# Patient Record
Sex: MALE | Race: WHITE
[De-identification: names, ages, dates, MRNs, and addresses within clinical notes are randomized per-mention and may not be internally consistent; named-entity substitution may affect disease eponyms.]

---

## 2019-04-25 ENCOUNTER — HOSPITAL ENCOUNTER (INPATIENT)
Dept: HOSPITAL 10 - E/R | Age: 69
LOS: 5 days | Discharge: HOME | DRG: 948 | End: 2019-04-30
Attending: INTERNAL MEDICINE | Admitting: INTERNAL MEDICINE
Payer: MEDICARE

## 2019-04-25 ENCOUNTER — HOSPITAL ENCOUNTER (INPATIENT)
Dept: HOSPITAL 91 - E/R | Age: 69
LOS: 5 days | Discharge: HOME | DRG: 948 | End: 2019-04-30
Payer: MEDICARE

## 2019-04-25 VITALS
HEIGHT: 65 IN | HEIGHT: 65 IN | WEIGHT: 153.22 LBS | BODY MASS INDEX: 25.53 KG/M2 | BODY MASS INDEX: 25.53 KG/M2 | WEIGHT: 153.22 LBS

## 2019-04-25 VITALS — HEART RATE: 98 BPM | DIASTOLIC BLOOD PRESSURE: 68 MMHG | RESPIRATION RATE: 17 BRPM | SYSTOLIC BLOOD PRESSURE: 108 MMHG

## 2019-04-25 VITALS — HEART RATE: 81 BPM | SYSTOLIC BLOOD PRESSURE: 112 MMHG | RESPIRATION RATE: 18 BRPM | DIASTOLIC BLOOD PRESSURE: 68 MMHG

## 2019-04-25 DIAGNOSIS — T40.605A: ICD-10-CM

## 2019-04-25 DIAGNOSIS — C61: ICD-10-CM

## 2019-04-25 DIAGNOSIS — G89.3: Primary | ICD-10-CM

## 2019-04-25 DIAGNOSIS — R11.2: ICD-10-CM

## 2019-04-25 DIAGNOSIS — E46: ICD-10-CM

## 2019-04-25 DIAGNOSIS — C79.51: ICD-10-CM

## 2019-04-25 LAB
% IRON SATURATION: 7 % SAT (ref 22–52)
ABNORMAL IP MESSAGE: 1
ADD MAN DIFF?: NO
ALANINE AMINOTRANSFERASE: 12 IU/L (ref 13–69)
ALBUMIN/GLOBULIN RATIO: 1.14
ALBUMIN: 4 G/DL (ref 3.3–4.9)
ALKALINE PHOSPHATASE: 226 IU/L (ref 42–121)
ANION GAP: 11 (ref 5–13)
ASPARTATE AMINO TRANSFERASE: 40 IU/L (ref 15–46)
BASOPHIL #: 0 10^3/UL (ref 0–0.1)
BASOPHILS %: 0.3 % (ref 0–2)
BILIRUBIN,DIRECT: 0 MG/DL (ref 0–0.2)
BILIRUBIN,TOTAL: 0.2 MG/DL (ref 0.2–1.3)
BLOOD UREA NITROGEN: 11 MG/DL (ref 7–20)
CALCIUM: 10 MG/DL (ref 8.4–10.2)
CARBON DIOXIDE: 28 MMOL/L (ref 21–31)
CHLORIDE: 98 MMOL/L (ref 97–110)
CREATININE: 0.76 MG/DL (ref 0.61–1.24)
EOSINOPHILS #: 0.1 10^3/UL (ref 0–0.5)
EOSINOPHILS %: 0.6 % (ref 0–7)
FERRITIN: 139 NG/ML (ref 11.1–264)
GLOBULIN: 3.5 G/DL (ref 1.3–3.2)
GLUCOSE: 167 MG/DL (ref 70–220)
HEMATOCRIT: 31.7 % (ref 42–52)
HEMOGLOBIN: 10.2 G/DL (ref 14–18)
IMMATURE GRANS #M: 0.04 10^3/UL (ref 0–0.03)
IMMATURE GRANS % (M): 0.4 % (ref 0–0.43)
INR: 1.11
IRON: 21 UG/DL (ref 35–150)
LACTATE DEHYDROGENASE: 556 IU/L (ref 313–618)
LYMPHOCYTES #: 0.5 10^3/UL (ref 0.8–2.9)
LYMPHOCYTES %: 4.4 % (ref 15–51)
MEAN CORPUSCULAR HEMOGLOBIN: 27.4 PG (ref 29–33)
MEAN CORPUSCULAR HGB CONC: 32.2 G/DL (ref 32–37)
MEAN CORPUSCULAR VOLUME: 85.2 FL (ref 82–101)
MEAN PLATELET VOLUME: 9 FL (ref 7.4–10.4)
MONOCYTE #: 0.7 10^3/UL (ref 0.3–0.9)
MONOCYTES %: 6.7 % (ref 0–11)
NEUTROPHIL #: 9 10^3/UL (ref 1.6–7.5)
NEUTROPHILS %: 87.6 % (ref 39–77)
NUCLEATED RED BLOOD CELLS #: 0 10^3/UL (ref 0–0)
NUCLEATED RED BLOOD CELLS%: 0 /100WBC (ref 0–0)
PARTIAL THROMBOPLASTIN TIME: 38.4 SEC (ref 23–35)
PLATELET COUNT: 384 10^3/UL (ref 140–415)
POTASSIUM: 4.5 MMOL/L (ref 3.5–5.1)
PROTIME: 14.4 SEC (ref 11.9–14.9)
PT RATIO: 1.1
RED BLOOD COUNT: 3.72 10^6/UL (ref 4.7–6.1)
RED CELL DISTRIBUTION WIDTH: 16 % (ref 11.5–14.5)
RETICULOCYTE COUNT #: 0.02 X10^6 (ref 0.02–0.11)
RETICULOCYTE COUNT %: 0.6 % (ref 0.5–1.5)
RETICULOCYTE RBC: 3.72
SODIUM: 137 MMOL/L (ref 135–144)
TOTAL IRON BINDING CAPACITY: 283 UG/DL (ref 241–421)
TOTAL PROTEIN: 7.5 G/DL (ref 6.1–8.1)
WHITE BLOOD COUNT: 10.3 10^3/UL (ref 4.8–10.8)

## 2019-04-25 PROCEDURE — 84100 ASSAY OF PHOSPHORUS: CPT

## 2019-04-25 PROCEDURE — 85025 COMPLETE CBC W/AUTO DIFF WBC: CPT

## 2019-04-25 PROCEDURE — 97110 THERAPEUTIC EXERCISES: CPT

## 2019-04-25 PROCEDURE — 86850 RBC ANTIBODY SCREEN: CPT

## 2019-04-25 PROCEDURE — 85730 THROMBOPLASTIN TIME PARTIAL: CPT

## 2019-04-25 PROCEDURE — 80053 COMPREHEN METABOLIC PANEL: CPT

## 2019-04-25 PROCEDURE — 97116 GAIT TRAINING THERAPY: CPT

## 2019-04-25 PROCEDURE — 80048 BASIC METABOLIC PNL TOTAL CA: CPT

## 2019-04-25 PROCEDURE — 83540 ASSAY OF IRON: CPT

## 2019-04-25 PROCEDURE — 93005 ELECTROCARDIOGRAM TRACING: CPT

## 2019-04-25 PROCEDURE — 86901 BLOOD TYPING SEROLOGIC RH(D): CPT

## 2019-04-25 PROCEDURE — 97163 PT EVAL HIGH COMPLEX 45 MIN: CPT

## 2019-04-25 PROCEDURE — 83615 LACTATE (LD) (LDH) ENZYME: CPT

## 2019-04-25 PROCEDURE — 86900 BLOOD TYPING SEROLOGIC ABO: CPT

## 2019-04-25 PROCEDURE — 82728 ASSAY OF FERRITIN: CPT

## 2019-04-25 PROCEDURE — 84466 ASSAY OF TRANSFERRIN: CPT

## 2019-04-25 PROCEDURE — 71045 X-RAY EXAM CHEST 1 VIEW: CPT

## 2019-04-25 PROCEDURE — 85610 PROTHROMBIN TIME: CPT

## 2019-04-25 PROCEDURE — 97530 THERAPEUTIC ACTIVITIES: CPT

## 2019-04-25 PROCEDURE — 83735 ASSAY OF MAGNESIUM: CPT

## 2019-04-25 PROCEDURE — 85045 AUTOMATED RETICULOCYTE COUNT: CPT

## 2019-04-25 RX ADMIN — TAMSULOSIN HYDROCHLORIDE 1 MG: 0.4 CAPSULE ORAL at 20:40

## 2019-04-25 RX ADMIN — ONDANSETRON HYDROCHLORIDE 1 MG: 2 INJECTION, SOLUTION INTRAMUSCULAR; INTRAVENOUS at 10:37

## 2019-04-25 RX ADMIN — ONDANSETRON HYDROCHLORIDE 1 MG: 2 INJECTION, SOLUTION INTRAMUSCULAR; INTRAVENOUS at 13:00

## 2019-04-25 RX ADMIN — HYDROMORPHONE HYDROCHLORIDE PRN MG: 1 INJECTION, SOLUTION INTRAMUSCULAR; INTRAVENOUS; SUBCUTANEOUS at 18:30

## 2019-04-25 RX ADMIN — MORPHINE SULFATE SCH MG: 30 TABLET, EXTENDED RELEASE ORAL at 20:40

## 2019-04-25 RX ADMIN — MORPHINE SULFATE 1 MG: 30 TABLET, EXTENDED RELEASE ORAL at 20:40

## 2019-04-25 RX ADMIN — TAMSULOSIN HYDROCHLORIDE SCH MG: 0.4 CAPSULE ORAL at 20:40

## 2019-04-25 RX ADMIN — HYDROMORPHONE HYDROCHLORIDE 1 MG: 1 INJECTION, SOLUTION INTRAMUSCULAR; INTRAVENOUS; SUBCUTANEOUS at 18:30

## 2019-04-25 RX ADMIN — THIAMINE HYDROCHLORIDE 1 MLS/HR: 100 INJECTION, SOLUTION INTRAMUSCULAR; INTRAVENOUS at 10:36

## 2019-04-25 RX ADMIN — HYDROMORPHONE HYDROCHLORIDE 1 MG: 2 INJECTION, SOLUTION INTRAMUSCULAR; INTRAVENOUS; SUBCUTANEOUS at 17:15

## 2019-04-25 RX ADMIN — ACETAMINOPHEN 1 MG: 325 TABLET, FILM COATED ORAL at 18:24

## 2019-04-25 RX ADMIN — HYDROMORPHONE HYDROCHLORIDE 1 MG: 1 INJECTION, SOLUTION INTRAMUSCULAR; INTRAVENOUS; SUBCUTANEOUS at 13:00

## 2019-04-25 RX ADMIN — HYDROMORPHONE HYDROCHLORIDE 1 MG: 1 INJECTION, SOLUTION INTRAMUSCULAR; INTRAVENOUS; SUBCUTANEOUS at 10:37

## 2019-04-25 NOTE — HP
Date/Time of Note


Date/Time of Note


DATE: 4/25/19 


TIME: 17:07





Assessment/Plan


VTE Prophylaxis


Pharmacological prophylaxis:  LMWH





Lines/Catheters


IV Catheter Type (from Nrsg):  Saline Lock





Assessment/Plan


Hospital Course


1.  Intractable pain secondary to metastatic prostate cancer


Continue home MS Contin 60 mg twice daily and will add Percocet and Dilaudid as 


needed


Follow-up with oncologist as outpatient





Prophylaxis: Lovenox


Result Diagram:  


4/25/19 1013                                                                    


           4/25/19 1013





Results 24hrs





Laboratory Tests


       Test
                                4/25/19
10:13  4/25/19
10:58


       White Blood Count                           10.3


       Red Blood Count                           3.72  #L


       Hemoglobin                                10.2  #L


       Hematocrit                                31.7  #L


       Mean Corpuscular Volume                     85.2


       Mean Corpuscular Hemoglobin                27.4  L


       Mean Corpuscular Hemoglobin
Concent        32.2  
  



       Red Cell Distribution Width                16.0  H


       Platelet Count                               384


       Mean Platelet Volume                         9.0


       Immature Granulocytes %                    0.400


       Neutrophils %                              87.6  H


       Lymphocytes %                               4.4  L


       Monocytes %                                  6.7


       Eosinophils %                                0.6


       Basophils %                                  0.3


       Nucleated Red Blood Cells %                  0.0


       Immature Granulocytes #                   0.040  H


       Neutrophils #                               9.0  H


       Lymphocytes #                               0.5  L


       Monocytes #                                  0.7


       Eosinophils #                                0.1


       Basophils #                                  0.0


       Nucleated Red Blood Cells #                  0.0


       Absolute Reticulocyte Count                0.023


       Percent Reticulocyte Count                   0.6


       Sodium Level                                 137


       Potassium Level                              4.5


       Chloride Level                                98


       Carbon Dioxide Level                          28


       Anion Gap                                     11


       Blood Urea Nitrogen                           11


       Creatinine                                  0.76


       Est Glomerular Filtrat Rate
mL/min   > 60  
        



       Glucose Level                                167


       Calcium Level                               10.0


       Iron Level                                   21  L


       Total Iron Binding Capacity                  283


       Percent Iron Saturation                       7  L


       Ferritin                                   139.0


       Total Bilirubin                              0.2


       Direct Bilirubin                            0.00


       Indirect Bilirubin                           0.2


       Aspartate Amino Transf
(AST/SGOT)            40  
  



       Alanine Aminotransferase
(ALT/SGPT)         12  L
  



       Alkaline Phosphatase                        226  H


       Lactate Dehydrogenase                        556


       Total Protein                                7.5


       Albumin                                      4.0


       Globulin                                   3.50  H


       Albumin/Globulin Ratio                      1.14


       Prothrombin Time                                           14.4


       Prothrombin Time Ratio                                      1.1


       INR International Normalized
Ratio   
                    1.11  



       Activated Partial
Thromboplast Time  
                   38.4  H









HPI/ROS


Admit Date/Time


Admit Date/Time


April 25, 2019





Hx of Present Illness


Patient is a 68-year-old male with a history of metastatic prostate cancer 


status post multiple rounds of therapy with no significant improvement.  Patient


has been told by his oncologist that they will monitor from now and potentially 


restart treatment in the future.  Patient lives with his mom and has been 


nonambulatory for the past several weeks and presents with worsening diffuse 


body pain.  Patient has been taking MS Contin 60 mg every 12 hours with no 


significant improvement in pain.  Patient has no other complaints at this time.





ROS


Constitutional:  no complaints, improved


Eyes:  no complaints


ENT:  no complaints


Respiratory:  no complaints


Cardiovascular:  no complaints


Gastrointestinal:  no complaints


Genitourinary:  no complaints


Musculoskeletal:  bone/joint pain


Skin:  no complaints


Neurologic:  no complaints


Endocrine:  no complaints


Lymphatic:  no complaints


Psychological:  no complaints, nl mood/affect


Immunologic:  no complaints





PMH/Family/Social


Past Medical History


Metastatic prostate cancer


Medications





Current Medications


Ondansetron HCl (Zofran Inj) 4 mg BRIDGE ORDER PRN IV NAUSEA/VOMITING;  Start 


4/25/19 at 14:30;  Stop 4/26/19 at 14:29


Acetaminophen (Tylenol Tab) 650 mg ER BRIDGE PRN PO .MILD PAIN 1-3 OR TEMP;  


Start 4/25/19 at 14:30;  Stop 4/26/19 at 14:29


Coded Allergies:  


     tetracycline (Unverified  Allergy, Unknown, RASH, 4/25/19)





Past Surgical History


Past Surgical Hx:  no surgical history





Family History


Significant Family History:  no pertinent family hx





Social History


Alcohol Use:  rarely


Smoking Status:  Never smoker


Drug Use:  none





Exam/Review of Systems


Vital Signs


Vitals





Vital Signs


  Date      Temp  Pulse  Resp  B/P (MAP)   Pulse Ox  O2          O2 Flow    FiO2


Time                                                 Delivery    Rate


   4/25/19  98.2    109    20      148/97        94  Room Air


     15:28                          (114)








Exam


Constitutional:  alert, oriented


Respiratory:  clear to auscultation


Cardiovascular:  regular rate and rhythm


Gastrointestinal:  soft; 


   No distended


Musculoskeletal:  nl extremities to inspection











ZOEY LÓPEZ                  Apr 25, 2019 17:09

## 2019-04-26 VITALS — HEART RATE: 97 BPM | DIASTOLIC BLOOD PRESSURE: 68 MMHG | RESPIRATION RATE: 18 BRPM | SYSTOLIC BLOOD PRESSURE: 109 MMHG

## 2019-04-26 VITALS — SYSTOLIC BLOOD PRESSURE: 90 MMHG | HEART RATE: 92 BPM | DIASTOLIC BLOOD PRESSURE: 57 MMHG | RESPIRATION RATE: 18 BRPM

## 2019-04-26 VITALS — DIASTOLIC BLOOD PRESSURE: 58 MMHG | RESPIRATION RATE: 18 BRPM | HEART RATE: 95 BPM | SYSTOLIC BLOOD PRESSURE: 101 MMHG

## 2019-04-26 VITALS — DIASTOLIC BLOOD PRESSURE: 68 MMHG | SYSTOLIC BLOOD PRESSURE: 103 MMHG | RESPIRATION RATE: 20 BRPM | HEART RATE: 101 BPM

## 2019-04-26 LAB
ADD MAN DIFF?: NO
ANION GAP: 10 (ref 5–13)
BASOPHIL #: 0 10^3/UL (ref 0–0.1)
BASOPHILS %: 0.5 % (ref 0–2)
BLOOD UREA NITROGEN: 9 MG/DL (ref 7–20)
CALCIUM: 9.3 MG/DL (ref 8.4–10.2)
CARBON DIOXIDE: 30 MMOL/L (ref 21–31)
CHLORIDE: 93 MMOL/L (ref 97–110)
CREATININE: 0.66 MG/DL (ref 0.61–1.24)
EOSINOPHILS #: 0.2 10^3/UL (ref 0–0.5)
EOSINOPHILS %: 2.6 % (ref 0–7)
GLUCOSE: 107 MG/DL (ref 70–220)
HEMATOCRIT: 28 % (ref 42–52)
HEMOGLOBIN: 9 G/DL (ref 14–18)
IMMATURE GRANS #M: 0.03 10^3/UL (ref 0–0.03)
IMMATURE GRANS % (M): 0.3 % (ref 0–0.43)
LYMPHOCYTES #: 0.9 10^3/UL (ref 0.8–2.9)
LYMPHOCYTES %: 10.6 % (ref 15–51)
MAGNESIUM: 1.9 MG/DL (ref 1.7–2.5)
MEAN CORPUSCULAR HEMOGLOBIN: 27.1 PG (ref 29–33)
MEAN CORPUSCULAR HGB CONC: 32.1 G/DL (ref 32–37)
MEAN CORPUSCULAR VOLUME: 84.3 FL (ref 82–101)
MEAN PLATELET VOLUME: 9.3 FL (ref 7.4–10.4)
MONOCYTE #: 1.1 10^3/UL (ref 0.3–0.9)
MONOCYTES %: 12.1 % (ref 0–11)
NEUTROPHIL #: 6.6 10^3/UL (ref 1.6–7.5)
NEUTROPHILS %: 73.9 % (ref 39–77)
NUCLEATED RED BLOOD CELLS #: 0 10^3/UL (ref 0–0)
NUCLEATED RED BLOOD CELLS%: 0 /100WBC (ref 0–0)
PHOSPHORUS: 4 MG/DL (ref 2.5–4.9)
PLATELET COUNT: 368 10^3/UL (ref 140–415)
POTASSIUM: 4.3 MMOL/L (ref 3.5–5.1)
RED BLOOD COUNT: 3.32 10^6/UL (ref 4.7–6.1)
RED CELL DISTRIBUTION WIDTH: 16.1 % (ref 11.5–14.5)
SODIUM: 133 MMOL/L (ref 135–144)
TRANSFERRIN: 190 MG/DL (ref 188–341)
WHITE BLOOD COUNT: 8.9 10^3/UL (ref 4.8–10.8)

## 2019-04-26 RX ADMIN — Medication SCH MLS/HR: at 19:03

## 2019-04-26 RX ADMIN — ONDANSETRON HYDROCHLORIDE 1 MG: 2 INJECTION, SOLUTION INTRAMUSCULAR; INTRAVENOUS at 21:00

## 2019-04-26 RX ADMIN — Medication 1 MLS/HR: at 19:03

## 2019-04-26 RX ADMIN — ENOXAPARIN SODIUM SCH MG: 100 INJECTION SUBCUTANEOUS at 09:20

## 2019-04-26 RX ADMIN — MORPHINE SULFATE 1 MG: 15 TABLET, EXTENDED RELEASE ORAL at 21:00

## 2019-04-26 RX ADMIN — OXYCODONE HYDROCHLORIDE AND ACETAMINOPHEN 1 TAB: 10; 325 TABLET ORAL at 05:30

## 2019-04-26 RX ADMIN — HYDROMORPHONE HYDROCHLORIDE 1 MG: 1 INJECTION, SOLUTION INTRAMUSCULAR; INTRAVENOUS; SUBCUTANEOUS at 21:17

## 2019-04-26 RX ADMIN — DEXAMETHASONE SODIUM PHOSPHATE 1 MG: 10 INJECTION, SOLUTION INTRAMUSCULAR; INTRAVENOUS at 23:26

## 2019-04-26 RX ADMIN — ENOXAPARIN SODIUM 1 MG: 100 INJECTION SUBCUTANEOUS at 09:20

## 2019-04-26 RX ADMIN — MORPHINE SULFATE SCH MG: 30 TABLET, EXTENDED RELEASE ORAL at 21:00

## 2019-04-26 RX ADMIN — MORPHINE SULFATE 1 MG: 30 TABLET, EXTENDED RELEASE ORAL at 21:00

## 2019-04-26 RX ADMIN — MAGNESIUM HYDROXIDE 1 ML: 400 SUSPENSION ORAL at 13:27

## 2019-04-26 RX ADMIN — MORPHINE SULFATE SCH MG: 30 TABLET, EXTENDED RELEASE ORAL at 09:21

## 2019-04-26 RX ADMIN — DEXAMETHASONE SODIUM PHOSPHATE 1 MG: 10 INJECTION, SOLUTION INTRAMUSCULAR; INTRAVENOUS at 18:00

## 2019-04-26 RX ADMIN — MORPHINE SULFATE 1 MG: 30 TABLET, EXTENDED RELEASE ORAL at 09:21

## 2019-04-26 RX ADMIN — ONDANSETRON HYDROCHLORIDE 1 MG: 2 INJECTION, SOLUTION INTRAMUSCULAR; INTRAVENOUS at 00:44

## 2019-04-26 RX ADMIN — MORPHINE SULFATE 1 MG: 30 TABLET, EXTENDED RELEASE ORAL at 13:27

## 2019-04-26 RX ADMIN — DEXAMETHASONE SODIUM PHOSPHATE SCH MG: 10 INJECTION, SOLUTION INTRAMUSCULAR; INTRAVENOUS at 18:00

## 2019-04-26 RX ADMIN — TAMSULOSIN HYDROCHLORIDE SCH MG: 0.4 CAPSULE ORAL at 23:15

## 2019-04-26 RX ADMIN — MAGNESIUM HYDROXIDE 1 ML: 400 SUSPENSION ORAL at 02:04

## 2019-04-26 RX ADMIN — HYDROMORPHONE HYDROCHLORIDE PRN MG: 1 INJECTION, SOLUTION INTRAMUSCULAR; INTRAVENOUS; SUBCUTANEOUS at 00:42

## 2019-04-26 RX ADMIN — DEXAMETHASONE SODIUM PHOSPHATE PRN MG: 10 INJECTION, SOLUTION INTRAMUSCULAR; INTRAVENOUS at 00:44

## 2019-04-26 RX ADMIN — DEXAMETHASONE SODIUM PHOSPHATE PRN MG: 10 INJECTION, SOLUTION INTRAMUSCULAR; INTRAVENOUS at 21:00

## 2019-04-26 RX ADMIN — Medication 1 MLS/HR: at 23:34

## 2019-04-26 RX ADMIN — HYDROMORPHONE HYDROCHLORIDE 1 MG: 1 INJECTION, SOLUTION INTRAMUSCULAR; INTRAVENOUS; SUBCUTANEOUS at 00:42

## 2019-04-26 RX ADMIN — TAMSULOSIN HYDROCHLORIDE 1 MG: 0.4 CAPSULE ORAL at 23:15

## 2019-04-26 RX ADMIN — Medication SCH MLS/HR: at 23:34

## 2019-04-26 RX ADMIN — DEXAMETHASONE SODIUM PHOSPHATE SCH MG: 10 INJECTION, SOLUTION INTRAMUSCULAR; INTRAVENOUS at 23:26

## 2019-04-26 NOTE — PN
Date/Time of Note


Date/Time of Note


DATE: 4/26/19 


TIME: 16:14





Assessment/Plan


VTE Prophylaxis


Risk score (from Nsg)>0 risk:  3


SCD applied (from Nsg):  Yes


Pharmacological prophylaxis:  LMWH





Lines/Catheters


IV Catheter Type (from Nrsg):  Saline Lock





Assessment/Plan


Hospital Course


1.  Intractable pain secondary to metastatic prostate cancer


Patient still with pain although improved, increase MS Contin frequency to every


8 hours


Continue Percocet and Dilaudid as needed


Palliative care consultation obtained


 to arrange for walker and home health


Continue PT


Follow-up with oncologist as outpatient, patient would like to continue 


chemotherapy in the future





Prophylaxis: Lovenox


Result Diagram:  


4/26/19 0621                                                                    


           4/26/19 0621





Results 24hrs





Laboratory Tests


               Test
                                4/26/19
06:21


               White Blood Count                            8.9


               Red Blood Count                            3.32  L


               Hemoglobin                                  9.0  L


               Hematocrit                                 28.0  L


               Mean Corpuscular Volume                     84.3


               Mean Corpuscular Hemoglobin                27.1  L


               Mean Corpuscular Hemoglobin
Concent        32.1  



               Red Cell Distribution Width                16.1  H


               Platelet Count                               368


               Mean Platelet Volume                         9.3


               Immature Granulocytes %                    0.300


               Neutrophils %                               73.9


               Lymphocytes %                              10.6  L


               Monocytes %                                12.1  H


               Eosinophils %                                2.6


               Basophils %                                  0.5


               Nucleated Red Blood Cells %                  0.0


               Immature Granulocytes #                    0.030


               Neutrophils #                                6.6


               Lymphocytes #                                0.9


               Monocytes #                                 1.1  H


               Eosinophils #                                0.2


               Basophils #                                  0.0


               Nucleated Red Blood Cells #                  0.0


               Sodium Level                                133  L


               Potassium Level                              4.3


               Chloride Level                               93  L


               Carbon Dioxide Level                          30


               Anion Gap                                     10


               Blood Urea Nitrogen                            9


               Creatinine                                  0.66


               Est Glomerular Filtrat Rate
mL/min   > 60  



               Glucose Level                               107  #


               Calcium Level                                9.3


               Phosphorus Level                             4.0


               Magnesium Level                              1.9








Subjective


24 Hr Interval Summary


Musculoskeletal:  bone/joint pain





Exam/Review of Systems


Exam


Vitals





Vital Signs


  Date      Temp  Pulse  Resp  B/P (MAP)   Pulse Ox  O2          O2 Flow    FiO2


Time                                                 Delivery    Rate


   4/26/19  98.7     92    18  90/57 (68)        93  Room Air


     14:42








Intake and Output





4/25/19 4/25/19 4/26/19





1515:00


23:00


07:00





IntakeIntake Total


400 ml


420 ml





BalanceBalance


400 ml


420 ml











Constitutional:  alert, oriented


Respiratory:  clear to auscultation


Cardiovascular:  regular rate and rhythm


Gastrointestinal:  soft; 


   No distended


Musculoskeletal:  nl extremities to inspection





Results


Results 24hrs





Laboratory Tests


               Test
                                4/26/19
06:21


               White Blood Count                            8.9


               Red Blood Count                            3.32  L


               Hemoglobin                                  9.0  L


               Hematocrit                                 28.0  L


               Mean Corpuscular Volume                     84.3


               Mean Corpuscular Hemoglobin                27.1  L


               Mean Corpuscular Hemoglobin
Concent        32.1  



               Red Cell Distribution Width                16.1  H


               Platelet Count                               368


               Mean Platelet Volume                         9.3


               Immature Granulocytes %                    0.300


               Neutrophils %                               73.9


               Lymphocytes %                              10.6  L


               Monocytes %                                12.1  H


               Eosinophils %                                2.6


               Basophils %                                  0.5


               Nucleated Red Blood Cells %                  0.0


               Immature Granulocytes #                    0.030


               Neutrophils #                                6.6


               Lymphocytes #                                0.9


               Monocytes #                                 1.1  H


               Eosinophils #                                0.2


               Basophils #                                  0.0


               Nucleated Red Blood Cells #                  0.0


               Sodium Level                                133  L


               Potassium Level                              4.3


               Chloride Level                               93  L


               Carbon Dioxide Level                          30


               Anion Gap                                     10


               Blood Urea Nitrogen                            9


               Creatinine                                  0.66


               Est Glomerular Filtrat Rate
mL/min   > 60  



               Glucose Level                               107  #


               Calcium Level                                9.3


               Phosphorus Level                             4.0


               Magnesium Level                              1.9








Medications


Medication





Current Medications


IV Flush (NS 3 ml) 3 ml PER PROTOCOL IV ;  Start 4/25/19 at 17:30


Ondansetron HCl (Zofran Inj) 4 mg Q6H  PRN IV NAUSEA/VOMITING Last administered 


on 4/26/19at 00:44; Admin Dose 4 MG;  Start 4/25/19 at 17:30


Acetaminophen (Tylenol Tab) 650 mg Q6H  PRN PO .PAIN 1-3 OR TEMP Last 


administered on 4/25/19at 18:24; Admin Dose 650 MG;  Start 4/25/19 at 17:30


Zolpidem Tartrate (Ambien) 5 mg QHS  PRN PO .INSOMNIA;  Start 4/25/19 at 17:30


Enoxaparin Sodium (Lovenox) 40 mg DAILY SC  Last administered on 4/26/19at 


09:20; Admin Dose 40 MG;  Start 4/26/19 at 09:00


Oxycodone/ Acetaminophen (Endocet (10/ 325)) 1 tab Q4H  PRN PO MODERATE PAIN 


LEVEL 4-6 Last administered on 4/26/19at 05:30; Admin Dose 1 TAB;  Start 4/25/19


at 17:30


Hydromorphone HCl (Dilaudid) 1 mg Q4H  PRN IV SEVERE PAIN LEVEL 7-10 Last 


administered on 4/26/19at 00:42; Admin Dose 1 MG;  Start 4/25/19 at 17:30


Tamsulosin HCl (Flomax) 0.4 mg HS PO  Last administered on 4/25/19at 20:40; 


Admin Dose 0.4 MG;  Start 4/25/19 at 21:00


Magnesium Hydroxide (Milk Of Mag) 30 ml Q12  PRN PO CONSTIPATION Last 


administered on 4/26/19at 13:27; Admin Dose 30 ML;  Start 4/26/19 at 01:00


Morphine Sulfate (Ms Contin (Er)) 60 mg TID PO  Last administered on 4/26/19at 


13:27; Admin Dose 60 MG;  Start 4/26/19 at 13:30











ZOEY LÓPEZ                  Apr 26, 2019 16:16

## 2019-04-26 NOTE — CONS
Assessment/Plan


Assessment/Plan


Assessment/Plan (Daily)


Metastatic prostate cancer


Metastasis to bones


Malnutrition


Pain out-of-control


Nausea vomiting possibly related to opioids


Depression anxiety





Because patient's pain is currently out of control it is some question whether 


or not oral opioids may be causing his emesis we will switch him over to PCA 


Dilaudid this evening.  We will give him 1 dose of 4 mg of IV Zometa start him 


off on nonsteroidal anti-inflammatory medications and Decadron for now 


anticipate tapering to p.o. medications beginning 1 to 2 days.  Ideally he would


be well controlled with methadone as an outpatient for bony metastasis however 


will discuss with Dr. Stovall in more detail.





Consultation Date/Type/Reason


Admit Date/Time


April 25, 2019


Date/Time of Note


DATE: 4/26/19 


TIME: 17:56





Hx of Present Illness


This is a 68-year-old gentleman is a very poor historian who presents [facility] with a history of metastatic prostate cancer with bony 


mets with pain out of control.  Patient describes his pain as excruciating bilat


eral shoulders lumbosacral spine bilateral pelvic regions and bilateral lower 


extremities.  Patient is been accelerating in intensity and then he is unclear 


as to how much morphine he was taking at home how much Norco.  However he states


his pain became out of control and presented to [facility] 


describes his pain is 10/10 before he came to hospital he is still very 


uncomfortable at this time unable to ambulate without assistance.  Pain is 


interfering with his physical functioning his mood and is sleeping pattern.  


Patient states he has been nauseous prior to hospitalization and vomiting during


his hospitalization but is not sure whether not is from the morphine or from Per


cocet.  Patient denies constipation itching mental cloudiness sweating fatigue 


drowsiness he is not negotiating for higher dose of pain control medication.  


There is no history of purposeful oversedation he does not appear to be 


intoxicated unkempt he is uncomfortable on examination with minimal movement.  


He is not requesting for higher doses just reasonable control of his current 


pain accelerates with minimal movement.  He has not asked for change in the 


route of administration there is no history of contact with street drugs.


We have an incomplete database and so far as what chemotherapy he has been given


in the past for last time he was administered chemotherapy.


Constitutional:  no complaints, improved


Eyes:  No no complaints, No pain, No discharge, No redness, No visual change, No


other


ENT:  no complaints; 


   No bleeding, No pain, No congestion, No discharge, No dysphagia, No sore 


throat, No other


Respiratory:  no complaints; 


   No pain, No cough, No pleuritic pain, No shortness of breath, No sputum, No 


wheezing, No other


Cardiovascular:  no complaints; 


   No chest pain, No edema, No lightheadedness, No orthopenea, No palpitations, 


No paroxysmal nocturnal dyspnea, No other


Genitourinary:  other (Refer to history of present illness)


Musculoskeletal:  other


Neurologic:  no complaints; 


   No confusion, No dizziness, No focal-weakness, No headache, No syncope, No 


seizure, No other


Psychological:  anxiety, depression





Past Medical History


Medical History:  cancer, other (History of prostate cancer with mets to the 


bone)


Home Meds


Active Scripts


Ondansetron Hcl* (Zofran* ODT) 4 mg -ODT Tab.disper, 4 MG PO Q4H PRN for NAUSEA 


AND OR VOMITING, #20 TAB


   Prov:AIDAN WESTBROOK MD         11/8/15


Reported Medications


Morphine Sulfate (Morphine Sulfate ER) 15 Mg Tablet.er, 1 TAB ORAL BID


   4/25/19


Multivitamins* (Once Daily*) 1 Tab Tablet, 1 TAB PO DAILY, TAB


   11/8/15


Tamsulosin Hcl* (Tamsulosin Hcl*) 0.4 Mg Cap.er.24h, 0.4 MG PO HS, CAP


   11/8/15


Discontinued Reported Medications


Citalopram Hydrobromide* (Citalopram Hydrobromide*) 20 Mg Tablet, 20 MG PO QHS, 


TAB


   11/8/15


Naproxen* (Naprosyn*) 500 Mg Tablet, 500 MG PO BID PRN for PAIN, TAB


   11/8/15


Discontinued Scripts


Cyclobenzaprine Hcl* (Cyclobenzaprine Hcl*) 10 Mg Tablet, 10 MG PO TID, #15 TAB


   Prov:JOSHUA GONSLAEZ DO         3/28/16


Ibuprofen* (Motrin*) 600 Mg Tab, 600 MG PO Q8, #14 TAB


   Prov:JOSHUA GONSALEZ DO         3/28/16


Hydrocodone Bit-Acetaminophen* (Norco*) 5-325 Mg Tab, 1 TAB PO Q6 PRN for PAIN, 


#7 TAB


   Prov:JOSHUA GONSALEZ DO         3/28/16


Ciprofloxacin Hcl* (Ciprofloxacin Hcl*) 500 Mg Tablet, 500 MG PO BID for 10 


Days, TAB


   Prov:AIDAN WESTBROOK MD         11/8/15


Medications





Current Medications


IV Flush (NS 3 ml) 3 ml PER PROTOCOL IV ;  Start 4/25/19 at 17:30


Ondansetron HCl (Zofran Inj) 4 mg Q6H  PRN IV NAUSEA/VOMITING Last administered 


on 4/26/19at 00:44; Admin Dose 4 MG;  Start 4/25/19 at 17:30


Acetaminophen (Tylenol Tab) 650 mg Q6H  PRN PO .PAIN 1-3 OR TEMP Last 


administered on 4/25/19at 18:24; Admin Dose 650 MG;  Start 4/25/19 at 17:30


Zolpidem Tartrate (Ambien) 5 mg QHS  PRN PO .INSOMNIA;  Start 4/25/19 at 17:30


Enoxaparin Sodium (Lovenox) 40 mg DAILY SC  Last administered on 4/26/19at 


09:20; Admin Dose 40 MG;  Start 4/26/19 at 09:00


Oxycodone/ Acetaminophen (Endocet (10/ 325)) 1 tab Q4H  PRN PO MODERATE PAIN 


LEVEL 4-6 Last administered on 4/26/19at 05:30; Admin Dose 1 TAB;  Start 4/25/19


at 17:30


Hydromorphone HCl (Dilaudid) 1 mg Q4H  PRN IV SEVERE PAIN LEVEL 7-10 Last 


administered on 4/26/19at 00:42; Admin Dose 1 MG;  Start 4/25/19 at 17:30


Tamsulosin HCl (Flomax) 0.4 mg HS PO  Last administered on 4/25/19at 20:40; 


Admin Dose 0.4 MG;  Start 4/25/19 at 21:00


Magnesium Hydroxide (Milk Of Mag) 30 ml Q12  PRN PO CONSTIPATION Last ad


ministered on 4/26/19at 13:27; Admin Dose 30 ML;  Start 4/26/19 at 01:00


Morphine Sulfate (Ms Contin (Er)) 60 mg TID PO  Last administered on 4/26/19at 


13:27; Admin Dose 60 MG;  Start 4/26/19 at 13:30


Allergies:  


Coded Allergies:  


     tetracycline (Unverified  Allergy, Unknown, RASH, 4/25/19)





Past Surgical History


Past Surgical Hx:  no surgical history





Social History


Alcohol Use:  rarely


Smoking Status:  Never smoker


Drug Use:  none





Exam/Review of Systems


Exam


Vitals





Vital Signs


  Date      Temp  Pulse  Resp  B/P (MAP)   Pulse Ox  O2          O2 Flow    FiO2


Time                                                 Delivery    Rate


   4/26/19  98.7     92    18  90/57 (68)        93  Room Air


     14:42








Intake and Output





4/25/19 4/25/19 4/26/19





1515:00


23:00


07:00





IntakeIntake Total


400 ml


420 ml





BalanceBalance


400 ml


420 ml











Constitutional:  alert, oriented, well developed, distress, frail


Psych:  anxiety


Head:  normocephalic, atraumatic; 


   No lacerations, No hematomas, No other


Eyes:  nl conjunctiva, EOMI, nl lids, nl sclera, PERRL; 


   No icteric, No fundi, disc, No other


ENMT:  nl external ears & nose, nl lips & teeth, nl nasal mucosa & septum


Neck:  supple, non-tender; 


   No jvd, No bruits, No masses, No thyromegaly, No nuchal rigidity, No other


Respiratory:  clear to auscultation, normal air movement; 


   No congested cough, No crackles/rales, No diminished breath sounds, No 


intercostal retraction, No labored breathing, No respirations, No tactile 


fremitus, No wheezing, No other


Cardiovascular:  regular rate and rhythm, nl pulses; 


   No bruits, No diastolic murmur, No edema, No gallop, No irregular rhythm, No 


jugular venous distention (JVD), No murmurs/extra sounds, No rub, No systolic 


murmur, No S3, No S4, No other


Neurological:  CNS II-XII intact, nl mental status, nl speech, nl strength; 


   No confused, No DTR's symmetric, No focal weakness, No lethargic, No 


numbness, No reflexes, No unresponsive, No other





Results


Result Diagram:  


4/26/19 0621 4/26/19 0621





Results 24hrs





Laboratory Tests


               Test
                                4/26/19
06:21


               White Blood Count                            8.9


               Red Blood Count                            3.32  L


               Hemoglobin                                  9.0  L


               Hematocrit                                 28.0  L


               Mean Corpuscular Volume                     84.3


               Mean Corpuscular Hemoglobin                27.1  L


               Mean Corpuscular Hemoglobin
Concent        32.1  



               Red Cell Distribution Width                16.1  H


               Platelet Count                               368


               Mean Platelet Volume                         9.3


               Immature Granulocytes %                    0.300


               Neutrophils %                               73.9


               Lymphocytes %                              10.6  L


               Monocytes %                                12.1  H


               Eosinophils %                                2.6


               Basophils %                                  0.5


               Nucleated Red Blood Cells %                  0.0


               Immature Granulocytes #                    0.030


               Neutrophils #                                6.6


               Lymphocytes #                                0.9


               Monocytes #                                 1.1  H


               Eosinophils #                                0.2


               Basophils #                                  0.0


               Nucleated Red Blood Cells #                  0.0


               Sodium Level                                133  L


               Potassium Level                              4.3


               Chloride Level                               93  L


               Carbon Dioxide Level                          30


               Anion Gap                                     10


               Blood Urea Nitrogen                            9


               Creatinine                                  0.66


               Est Glomerular Filtrat Rate
mL/min   > 60  



               Glucose Level                               107  #


               Calcium Level                                9.3


               Phosphorus Level                             4.0


               Magnesium Level                              1.9








Medications


Medication





Current Medications


IV Flush (NS 3 ml) 3 ml PER PROTOCOL IV ;  Start 4/25/19 at 17:30


Ondansetron HCl (Zofran Inj) 4 mg Q6H  PRN IV NAUSEA/VOMITING Last administered 


on 4/26/19at 00:44; Admin Dose 4 MG;  Start 4/25/19 at 17:30


Acetaminophen (Tylenol Tab) 650 mg Q6H  PRN PO .PAIN 1-3 OR TEMP Last 


administered on 4/25/19at 18:24; Admin Dose 650 MG;  Start 4/25/19 at 17:30


Zolpidem Tartrate (Ambien) 5 mg QHS  PRN PO .INSOMNIA;  Start 4/25/19 at 17:30


Enoxaparin Sodium (Lovenox) 40 mg DAILY SC  Last administered on 4/26/19at 


09:20; Admin Dose 40 MG;  Start 4/26/19 at 09:00


Oxycodone/ Acetaminophen (Endocet (10/ 325)) 1 tab Q4H  PRN PO MODERATE PAIN 


LEVEL 4-6 Last administered on 4/26/19at 05:30; Admin Dose 1 TAB;  Start 4/25/19


at 17:30


Hydromorphone HCl (Dilaudid) 1 mg Q4H  PRN IV SEVERE PAIN LEVEL 7-10 Last 


administered on 4/26/19at 00:42; Admin Dose 1 MG;  Start 4/25/19 at 17:30


Tamsulosin HCl (Flomax) 0.4 mg HS PO  Last administered on 4/25/19at 20:40; 


Admin Dose 0.4 MG;  Start 4/25/19 at 21:00


Magnesium Hydroxide (Milk Of Mag) 30 ml Q12  PRN PO CONSTIPATION Last 


administered on 4/26/19at 13:27; Admin Dose 30 ML;  Start 4/26/19 at 01:00


Morphine Sulfate (Ms Contin (Er)) 60 mg TID PO  Last administered on 4/26/19at 


13:27; Admin Dose 60 MG;  Start 4/26/19 at 13:30











KECIA MATTHEW             Apr 26, 2019 18:00

## 2019-04-27 VITALS — SYSTOLIC BLOOD PRESSURE: 103 MMHG | DIASTOLIC BLOOD PRESSURE: 72 MMHG | HEART RATE: 77 BPM | RESPIRATION RATE: 16 BRPM

## 2019-04-27 VITALS — HEART RATE: 74 BPM | SYSTOLIC BLOOD PRESSURE: 102 MMHG | RESPIRATION RATE: 16 BRPM | DIASTOLIC BLOOD PRESSURE: 64 MMHG

## 2019-04-27 VITALS — RESPIRATION RATE: 18 BRPM | SYSTOLIC BLOOD PRESSURE: 101 MMHG | HEART RATE: 70 BPM | DIASTOLIC BLOOD PRESSURE: 66 MMHG

## 2019-04-27 RX ADMIN — ONDANSETRON HYDROCHLORIDE 1 MG: 2 INJECTION, SOLUTION INTRAMUSCULAR; INTRAVENOUS at 09:06

## 2019-04-27 RX ADMIN — ZOLEDRONIC ACID 1 MLS/HR: 4 INJECTION, SOLUTION, CONCENTRATE INTRAVENOUS at 01:14

## 2019-04-27 RX ADMIN — Medication SCH MLS/HR: at 05:35

## 2019-04-27 RX ADMIN — MORPHINE SULFATE SCH MG: 30 TABLET, EXTENDED RELEASE ORAL at 13:57

## 2019-04-27 RX ADMIN — DEXAMETHASONE SODIUM PHOSPHATE SCH MG: 10 INJECTION, SOLUTION INTRAMUSCULAR; INTRAVENOUS at 17:24

## 2019-04-27 RX ADMIN — DEXAMETHASONE SODIUM PHOSPHATE 1 MG: 10 INJECTION, SOLUTION INTRAMUSCULAR; INTRAVENOUS at 17:24

## 2019-04-27 RX ADMIN — DEXAMETHASONE SODIUM PHOSPHATE SCH MG: 10 INJECTION, SOLUTION INTRAMUSCULAR; INTRAVENOUS at 05:35

## 2019-04-27 RX ADMIN — TAMSULOSIN HYDROCHLORIDE 1 MG: 0.4 CAPSULE ORAL at 20:58

## 2019-04-27 RX ADMIN — MORPHINE SULFATE 1 MG: 30 TABLET, EXTENDED RELEASE ORAL at 20:58

## 2019-04-27 RX ADMIN — Medication 1 MLS/HR: at 05:35

## 2019-04-27 RX ADMIN — DEXAMETHASONE SODIUM PHOSPHATE PRN MG: 10 INJECTION, SOLUTION INTRAMUSCULAR; INTRAVENOUS at 09:06

## 2019-04-27 RX ADMIN — MORPHINE SULFATE SCH MG: 30 TABLET, EXTENDED RELEASE ORAL at 09:05

## 2019-04-27 RX ADMIN — MORPHINE SULFATE 1 MG: 30 TABLET, EXTENDED RELEASE ORAL at 13:57

## 2019-04-27 RX ADMIN — DEXAMETHASONE SODIUM PHOSPHATE 1 MG: 10 INJECTION, SOLUTION INTRAMUSCULAR; INTRAVENOUS at 05:35

## 2019-04-27 RX ADMIN — DEXAMETHASONE SODIUM PHOSPHATE SCH MG: 10 INJECTION, SOLUTION INTRAMUSCULAR; INTRAVENOUS at 15:54

## 2019-04-27 RX ADMIN — TAMSULOSIN HYDROCHLORIDE SCH MG: 0.4 CAPSULE ORAL at 20:58

## 2019-04-27 RX ADMIN — ENOXAPARIN SODIUM SCH MG: 100 INJECTION SUBCUTANEOUS at 09:06

## 2019-04-27 RX ADMIN — Medication SCH MLS/HR: at 13:57

## 2019-04-27 RX ADMIN — ENOXAPARIN SODIUM 1 MG: 100 INJECTION SUBCUTANEOUS at 09:06

## 2019-04-27 RX ADMIN — Medication 1 MLS/HR: at 13:57

## 2019-04-27 RX ADMIN — MORPHINE SULFATE 1 MG: 30 TABLET, EXTENDED RELEASE ORAL at 09:05

## 2019-04-27 RX ADMIN — MORPHINE SULFATE SCH MG: 30 TABLET, EXTENDED RELEASE ORAL at 20:58

## 2019-04-27 RX ADMIN — DEXAMETHASONE SODIUM PHOSPHATE 1 MG: 10 INJECTION, SOLUTION INTRAMUSCULAR; INTRAVENOUS at 15:54

## 2019-04-27 NOTE — PN
Date/Time of Note


Date/Time of Note


DATE: 4/27/19 


TIME: 12:30





Assessment/Plan


VTE Prophylaxis


Risk score (from Nsg)>0 risk:  5


SCD applied (from Nsg):  Yes


Pharmacological prophylaxis:  LMWH





Lines/Catheters


IV Catheter Type (from Nrsg):  Peripheral IV





Assessment/Plan


Hospital Course


1.  Intractable pain secondary to metastatic prostate cancer


Patient still with pain although improved, increased MS Contin frequency to 


every 8 hours


Continue Dilaudid as needed


Palliative care consultation appreciated, patient started on Decadron


 to arrange for walker and home health


Continue PT


Follow-up with oncologist as outpatient, patient would like to continue 


chemotherapy in the future





2.  Intractable nausea and vomiting possibly secondary to opiates


Zofran as needed


Monitor





Prophylaxis: Lovenox





DC planning: Patient not stable for DC as he continues to have nausea and 


vomiting


Result Diagram:  


4/26/19 0621 4/26/19 0621








Subjective


24 Hr Interval Summary


Gastrointestinal:  nausea, vomiting


Musculoskeletal:  bone/joint pain





Exam/Review of Systems


Exam


Vitals





Vital Signs


  Date      Temp  Pulse  Resp  B/P (MAP)   Pulse Ox  O2          O2 Flow    FiO2


Time                                                 Delivery    Rate


   4/27/19  98.2     74    16      102/64        93


     07:55                           (77)


   4/26/19                                           Room Air


     14:42








Intake and Output





4/26/19 4/26/19 4/27/19





1515:00


23:00


07:00





IntakeIntake Total


580 ml


300 ml


555 ml





OutputOutput Total


150 ml


2 ml





BalanceBalance


430 ml


300 ml


553 ml











Constitutional:  alert, oriented


Respiratory:  clear to auscultation


Cardiovascular:  regular rate and rhythm


Gastrointestinal:  soft; 


   No distended


Musculoskeletal:  nl extremities to inspection





Medications


Medication





Current Medications


IV Flush (NS 3 ml) 3 ml PER PROTOCOL IV ;  Start 4/25/19 at 17:30


Ondansetron HCl (Zofran Inj) 4 mg Q6H  PRN IV NAUSEA/VOMITING Last administered 


on 4/27/19at 09:06; Admin Dose 4 MG;  Start 4/25/19 at 17:30


Zolpidem Tartrate (Ambien) 5 mg QHS  PRN PO .INSOMNIA;  Start 4/25/19 at 17:30


Enoxaparin Sodium (Lovenox) 40 mg DAILY SC  Last administered on 4/27/19at 


09:06; Admin Dose 40 MG;  Start 4/26/19 at 09:00


Tamsulosin HCl (Flomax) 0.4 mg HS PO  Last administered on 4/26/19at 23:15; 


Admin Dose 0.4 MG;  Start 4/25/19 at 21:00


Magnesium Hydroxide (Milk Of Mag) 30 ml Q12  PRN PO CONSTIPATION Last 


administered on 4/26/19at 13:27; Admin Dose 30 ML;  Start 4/26/19 at 01:00


Dexamethasone (Decadron) 6 mg Q6 IV  Last administered on 4/27/19at 05:35; Admin


Dose 6 MG;  Start 4/26/19 at 18:00


Acetaminophen 100 ml @  400 mls/hr Q6H IVPB  Last administered on 4/27/19at 


05:35; Admin Dose 400 MLS/HR;  Start 4/26/19 at 18:00;  Stop 4/27/19 at 17:59


Morphine Sulfate (Ms Contin (Er)) 60 mg TID PO  Last administered on 4/27/19at 


09:05; Admin Dose 60 MG;  Start 4/26/19 at 21:00


Hydromorphone HCl (Dilaudid) 1 mg Q3H  PRN IV SEVERE PAIN LEVEL 7-10 Last 


administered on 4/26/19at 21:17; Admin Dose 1 MG;  Start 4/26/19 at 19:00











ZOEY LÓPEZ                  Apr 27, 2019 12:31

## 2019-04-28 VITALS — DIASTOLIC BLOOD PRESSURE: 59 MMHG | SYSTOLIC BLOOD PRESSURE: 92 MMHG | HEART RATE: 67 BPM | RESPIRATION RATE: 16 BRPM

## 2019-04-28 VITALS — SYSTOLIC BLOOD PRESSURE: 97 MMHG | DIASTOLIC BLOOD PRESSURE: 64 MMHG | RESPIRATION RATE: 17 BRPM | HEART RATE: 71 BPM

## 2019-04-28 VITALS — DIASTOLIC BLOOD PRESSURE: 63 MMHG | HEART RATE: 66 BPM | RESPIRATION RATE: 17 BRPM | SYSTOLIC BLOOD PRESSURE: 103 MMHG

## 2019-04-28 VITALS — RESPIRATION RATE: 16 BRPM | SYSTOLIC BLOOD PRESSURE: 101 MMHG | DIASTOLIC BLOOD PRESSURE: 67 MMHG | HEART RATE: 73 BPM

## 2019-04-28 LAB
ANION GAP: 9 (ref 5–13)
BLOOD UREA NITROGEN: 16 MG/DL (ref 7–20)
CALCIUM: 8.5 MG/DL (ref 8.4–10.2)
CARBON DIOXIDE: 30 MMOL/L (ref 21–31)
CHLORIDE: 94 MMOL/L (ref 97–110)
CREATININE: 0.57 MG/DL (ref 0.61–1.24)
GLUCOSE: 151 MG/DL (ref 70–220)
MAGNESIUM: 2.7 MG/DL (ref 1.7–2.5)
PHOSPHORUS: 3.2 MG/DL (ref 2.5–4.9)
POTASSIUM: 4.5 MMOL/L (ref 3.5–5.1)
SODIUM: 133 MMOL/L (ref 135–144)

## 2019-04-28 RX ADMIN — MORPHINE SULFATE 1 MG: 30 TABLET, EXTENDED RELEASE ORAL at 09:44

## 2019-04-28 RX ADMIN — ENOXAPARIN SODIUM SCH MG: 100 INJECTION SUBCUTANEOUS at 09:45

## 2019-04-28 RX ADMIN — DEXAMETHASONE SODIUM PHOSPHATE 1 MG: 10 INJECTION, SOLUTION INTRAMUSCULAR; INTRAVENOUS at 18:21

## 2019-04-28 RX ADMIN — MAGNESIUM HYDROXIDE 1 ML: 400 SUSPENSION ORAL at 00:13

## 2019-04-28 RX ADMIN — ENOXAPARIN SODIUM 1 MG: 100 INJECTION SUBCUTANEOUS at 09:45

## 2019-04-28 RX ADMIN — MORPHINE SULFATE SCH MG: 30 TABLET, EXTENDED RELEASE ORAL at 20:21

## 2019-04-28 RX ADMIN — DEXAMETHASONE SODIUM PHOSPHATE 1 MG: 10 INJECTION, SOLUTION INTRAMUSCULAR; INTRAVENOUS at 00:05

## 2019-04-28 RX ADMIN — MAGNESIUM HYDROXIDE 1 ML: 400 SUSPENSION ORAL at 20:21

## 2019-04-28 RX ADMIN — DEXAMETHASONE SODIUM PHOSPHATE SCH MG: 10 INJECTION, SOLUTION INTRAMUSCULAR; INTRAVENOUS at 23:51

## 2019-04-28 RX ADMIN — DEXAMETHASONE SODIUM PHOSPHATE SCH MG: 10 INJECTION, SOLUTION INTRAMUSCULAR; INTRAVENOUS at 18:21

## 2019-04-28 RX ADMIN — MORPHINE SULFATE SCH MG: 30 TABLET, EXTENDED RELEASE ORAL at 09:44

## 2019-04-28 RX ADMIN — DEXAMETHASONE SODIUM PHOSPHATE SCH MG: 10 INJECTION, SOLUTION INTRAMUSCULAR; INTRAVENOUS at 00:05

## 2019-04-28 RX ADMIN — MORPHINE SULFATE 1 MG: 30 TABLET, EXTENDED RELEASE ORAL at 13:21

## 2019-04-28 RX ADMIN — ZOLPIDEM TARTRATE 1 MG: 5 TABLET, FILM COATED ORAL at 00:05

## 2019-04-28 RX ADMIN — DEXAMETHASONE SODIUM PHOSPHATE SCH MG: 10 INJECTION, SOLUTION INTRAMUSCULAR; INTRAVENOUS at 14:37

## 2019-04-28 RX ADMIN — MORPHINE SULFATE SCH MG: 30 TABLET, EXTENDED RELEASE ORAL at 13:21

## 2019-04-28 RX ADMIN — MORPHINE SULFATE 1 MG: 30 TABLET, EXTENDED RELEASE ORAL at 20:21

## 2019-04-28 RX ADMIN — DEXAMETHASONE SODIUM PHOSPHATE 1 MG: 10 INJECTION, SOLUTION INTRAMUSCULAR; INTRAVENOUS at 23:51

## 2019-04-28 RX ADMIN — DEXAMETHASONE SODIUM PHOSPHATE 1 MG: 10 INJECTION, SOLUTION INTRAMUSCULAR; INTRAVENOUS at 14:37

## 2019-04-28 RX ADMIN — TAMSULOSIN HYDROCHLORIDE 1 MG: 0.4 CAPSULE ORAL at 20:21

## 2019-04-28 RX ADMIN — TAMSULOSIN HYDROCHLORIDE SCH MG: 0.4 CAPSULE ORAL at 20:21

## 2019-04-28 RX ADMIN — DEXAMETHASONE SODIUM PHOSPHATE SCH MG: 10 INJECTION, SOLUTION INTRAMUSCULAR; INTRAVENOUS at 05:38

## 2019-04-28 RX ADMIN — DEXAMETHASONE SODIUM PHOSPHATE 1 MG: 10 INJECTION, SOLUTION INTRAMUSCULAR; INTRAVENOUS at 05:38

## 2019-04-28 NOTE — PN
Date/Time of Note


Date/Time of Note


DATE: 4/28/19 


TIME: 13:15





Assessment/Plan


VTE Prophylaxis


Risk score (from Nsg)>0 risk:  5


SCD applied (from Nsg):  Yes


Pharmacological prophylaxis:  LMWH





Lines/Catheters


IV Catheter Type (from Nrsg):  Saline Lock





Assessment/Plan


Hospital Course


1.  Intractable pain secondary to metastatic prostate cancer


Patient still with pain although improved, have increased MS Contin frequency to


every 8 hours


Transition off Dilaudid IV, have started Percocet as needed for severe pain and 


Dilaudid only for breakthrough


Patient will likely be discharged tomorrow with MS Contin 60 3 times daily and 


Percocet as needed if able to get off Dilaudid


Palliative care consultation appreciated, patient started on Decadron


 to arrange for walker and home health


Continue PT


Follow-up with oncologist as outpatient, patient would like to continue 


chemotherapy in the future





2.  Intractable nausea and vomiting possibly secondary to opiates-improved


Zofran as needed


Monitor





Prophylaxis: Lovenox





DC planning: Attempts to wean off Dilaudid IV today, have added Percocet in elian


tion to his MS Contin, anticipate DC to home tomorrow with MS Contin 60 mg 3 


times daily and Percocet


Result Diagram:  


4/26/19 0621                                                                    


           4/28/19 0540





Results 24hrs





Laboratory Tests


               Test
                               4/28/19
05:40


               Sodium Level                               133  L


               Potassium Level                             4.5


               Chloride Level                              94  L


               Carbon Dioxide Level                         30


               Anion Gap                                     9


               Blood Urea Nitrogen                          16


               Creatinine                                0.57  L


               Est Glomerular Filtrat Rate
mL/min  > 60  



               Glucose Level                               151


               Calcium Level                               8.5


               Phosphorus Level                            3.2


               Magnesium Level                            2.7  H








Subjective


24 Hr Interval Summary


Musculoskeletal:  bone/joint pain





Exam/Review of Systems


Exam


Vitals





Vital Signs


  Date      Temp  Pulse  Resp  B/P (MAP)   Pulse Ox  O2          O2 Flow    FiO2


Time                                                 Delivery    Rate


   4/28/19  97.6     67    16  92/59 (70)        97


     08:18


   4/26/19                                           Room Air


     14:42








Intake and Output





4/27/19 4/27/19 4/28/19





1515:00


23:00


07:00





IntakeIntake Total


100 ml


900 ml


240 ml





OutputOutput Total


501 ml


500 ml





BalanceBalance


100 ml


399 ml


-260 ml











Constitutional:  alert, oriented


Respiratory:  clear to auscultation


Cardiovascular:  regular rate and rhythm


Gastrointestinal:  soft; 


   No distended


Musculoskeletal:  nl extremities to inspection





Results


Results 24hrs





Laboratory Tests


               Test
                               4/28/19
05:40


               Sodium Level                               133  L


               Potassium Level                             4.5


               Chloride Level                              94  L


               Carbon Dioxide Level                         30


               Anion Gap                                     9


               Blood Urea Nitrogen                          16


               Creatinine                                0.57  L


               Est Glomerular Filtrat Rate
mL/min  > 60  



               Glucose Level                               151


               Calcium Level                               8.5


               Phosphorus Level                            3.2


               Magnesium Level                            2.7  H








Medications


Medication





Current Medications


IV Flush (NS 3 ml) 3 ml PER PROTOCOL IV ;  Start 4/25/19 at 17:30


Ondansetron HCl (Zofran Inj) 4 mg Q6H  PRN IV NAUSEA/VOMITING Last administered 


on 4/27/19at 09:06; Admin Dose 4 MG;  Start 4/25/19 at 17:30


Zolpidem Tartrate (Ambien) 5 mg QHS  PRN PO .INSOMNIA Last administered on 


4/28/19at 00:05; Admin Dose 5 MG;  Start 4/25/19 at 17:30


Enoxaparin Sodium (Lovenox) 40 mg DAILY SC  Last administered on 4/28/19at 


09:45; Admin Dose 40 MG;  Start 4/26/19 at 09:00


Tamsulosin HCl (Flomax) 0.4 mg HS PO  Last administered on 4/27/19at 20:58; 


Admin Dose 0.4 MG;  Start 4/25/19 at 21:00


Magnesium Hydroxide (Milk Of Mag) 30 ml Q12  PRN PO CONSTIPATION Last 


administered on 4/28/19at 00:13; Admin Dose 30 ML;  Start 4/26/19 at 01:00


Dexamethasone (Decadron) 6 mg Q6 IV  Last administered on 4/28/19at 05:38; Admin


Dose 6 MG;  Start 4/26/19 at 18:00


Morphine Sulfate (Ms Contin (Er)) 60 mg TID PO  Last administered on 4/28/19at 


09:44; Admin Dose 60 MG;  Start 4/26/19 at 21:00


Hydromorphone HCl (Dilaudid) 1 mg Q3H  PRN IV SEVERE PAIN LEVEL 7-10 Last 


administered on 4/26/19at 21:17; Admin Dose 1 MG;  Start 4/26/19 at 19:00











ZOEY LÓPEZ                  Apr 28, 2019 13:18

## 2019-04-29 VITALS — HEART RATE: 63 BPM | SYSTOLIC BLOOD PRESSURE: 112 MMHG | RESPIRATION RATE: 16 BRPM | DIASTOLIC BLOOD PRESSURE: 71 MMHG

## 2019-04-29 VITALS — SYSTOLIC BLOOD PRESSURE: 101 MMHG | HEART RATE: 64 BPM | RESPIRATION RATE: 17 BRPM | DIASTOLIC BLOOD PRESSURE: 65 MMHG

## 2019-04-29 VITALS — HEART RATE: 68 BPM | SYSTOLIC BLOOD PRESSURE: 115 MMHG | RESPIRATION RATE: 18 BRPM | DIASTOLIC BLOOD PRESSURE: 70 MMHG

## 2019-04-29 VITALS — HEART RATE: 74 BPM | RESPIRATION RATE: 19 BRPM | DIASTOLIC BLOOD PRESSURE: 57 MMHG | SYSTOLIC BLOOD PRESSURE: 102 MMHG

## 2019-04-29 RX ADMIN — MORPHINE SULFATE 1 MG: 30 TABLET, EXTENDED RELEASE ORAL at 12:31

## 2019-04-29 RX ADMIN — TAMSULOSIN HYDROCHLORIDE 1 MG: 0.4 CAPSULE ORAL at 20:31

## 2019-04-29 RX ADMIN — DEXAMETHASONE SODIUM PHOSPHATE SCH MG: 10 INJECTION, SOLUTION INTRAMUSCULAR; INTRAVENOUS at 11:47

## 2019-04-29 RX ADMIN — MORPHINE SULFATE 1 MG: 30 TABLET, EXTENDED RELEASE ORAL at 20:31

## 2019-04-29 RX ADMIN — DEXAMETHASONE SODIUM PHOSPHATE SCH MG: 10 INJECTION, SOLUTION INTRAMUSCULAR; INTRAVENOUS at 05:36

## 2019-04-29 RX ADMIN — MORPHINE SULFATE 1 MG: 30 TABLET, EXTENDED RELEASE ORAL at 08:39

## 2019-04-29 RX ADMIN — TAMSULOSIN HYDROCHLORIDE SCH MG: 0.4 CAPSULE ORAL at 20:31

## 2019-04-29 RX ADMIN — DEXAMETHASONE SODIUM PHOSPHATE 1 MG: 10 INJECTION, SOLUTION INTRAMUSCULAR; INTRAVENOUS at 18:06

## 2019-04-29 RX ADMIN — MORPHINE SULFATE SCH MG: 30 TABLET, EXTENDED RELEASE ORAL at 20:31

## 2019-04-29 RX ADMIN — DEXAMETHASONE SODIUM PHOSPHATE 1 MG: 10 INJECTION, SOLUTION INTRAMUSCULAR; INTRAVENOUS at 11:47

## 2019-04-29 RX ADMIN — MORPHINE SULFATE SCH MG: 30 TABLET, EXTENDED RELEASE ORAL at 12:31

## 2019-04-29 RX ADMIN — DEXAMETHASONE SODIUM PHOSPHATE SCH MG: 10 INJECTION, SOLUTION INTRAMUSCULAR; INTRAVENOUS at 18:06

## 2019-04-29 RX ADMIN — DEXAMETHASONE SODIUM PHOSPHATE 1 MG: 10 INJECTION, SOLUTION INTRAMUSCULAR; INTRAVENOUS at 05:36

## 2019-04-29 RX ADMIN — ENOXAPARIN SODIUM 1 MG: 100 INJECTION SUBCUTANEOUS at 08:40

## 2019-04-29 RX ADMIN — ENOXAPARIN SODIUM SCH MG: 100 INJECTION SUBCUTANEOUS at 08:40

## 2019-04-29 RX ADMIN — MAGNESIUM HYDROXIDE 1 ML: 400 SUSPENSION ORAL at 16:34

## 2019-04-29 RX ADMIN — MORPHINE SULFATE SCH MG: 30 TABLET, EXTENDED RELEASE ORAL at 08:39

## 2019-04-29 NOTE — PN
Date/Time of Note


Date/Time of Note


DATE: 4/29/19 


TIME: 15:47





Assessment/Plan


VTE Prophylaxis


Risk score (from Nsg)>0 risk:  5


SCD applied (from Nsg):  Yes


Pharmacological prophylaxis:  heparin





Lines/Catheters


IV Catheter Type (from Nrsg):  Saline Lock





Assessment/Plan


Hospital Course


67 yo male with metastatic prostate cancer admitted for pain control


- pain controlled on current regimen


- dc home tomorrow with PO meds Rx


Result Diagram:  


4/26/19 0621                                                                    


           4/28/19 0540








Subjective


24 Hr Interval Summary


Free Text/Dictation


Pain is controlled


Offered discharge but he is not wanting to go home yet





Exam/Review of Systems


Exam


Vitals





Vital Signs


  Date      Temp  Pulse  Resp  B/P (MAP)   Pulse Ox  O2          O2 Flow    FiO2


Time                                                 Delivery    Rate


   4/29/19  96.8     74    19      102/57        92


     15:04                           (72)


   4/26/19                                           Room Air


     14:42








Intake and Output





4/28/19 4/28/19 4/29/19





1515:00


23:00


07:00





IntakeIntake Total


1480 ml


200 ml


700 ml





OutputOutput Total


700 ml





BalanceBalance


1480 ml


200 ml


0 ml














Medications


Medication





Current Medications


IV Flush (NS 3 ml) 3 ml PER PROTOCOL IV ;  Start 4/25/19 at 17:30


Ondansetron HCl (Zofran Inj) 4 mg Q6H  PRN IV NAUSEA/VOMITING Last administered 


on 4/27/19at 09:06; Admin Dose 4 MG;  Start 4/25/19 at 17:30


Zolpidem Tartrate (Ambien) 5 mg QHS  PRN PO .INSOMNIA Last administered on 


4/28/19at 00:05; Admin Dose 5 MG;  Start 4/25/19 at 17:30


Enoxaparin Sodium (Lovenox) 40 mg DAILY SC  Last administered on 4/29/19at 


08:40; Admin Dose 40 MG;  Start 4/26/19 at 09:00


Tamsulosin HCl (Flomax) 0.4 mg HS PO  Last administered on 4/28/19at 20:21; 


Admin Dose 0.4 MG;  Start 4/25/19 at 21:00


Magnesium Hydroxide (Milk Of Mag) 30 ml Q12  PRN PO CONSTIPATION Last 


administered on 4/28/19at 20:21; Admin Dose 30 ML;  Start 4/26/19 at 01:00


Dexamethasone (Decadron) 6 mg Q6 IV  Last administered on 4/29/19at 11:47; Admin


Dose 6 MG;  Start 4/26/19 at 18:00


Morphine Sulfate (Ms Contin (Er)) 60 mg TID PO  Last administered on 4/29/19at 


12:31; Admin Dose 60 MG;  Start 4/26/19 at 21:00


Hydromorphone HCl (Dilaudid) 1 mg Q4  PRN IV BREAKTHROUGH PAIN;  Start 4/28/19 


at 13:30


Oxycodone/ Acetaminophen (Percocet (5/ 325)) 1 tab Q4H  PRN PO MODERATE PAIN 


LEVEL 4-6;  Start 4/28/19 at 13:30











MAURICIO BLAKELY MD          Apr 29, 2019 15:52

## 2019-04-30 VITALS — HEART RATE: 59 BPM | SYSTOLIC BLOOD PRESSURE: 114 MMHG | RESPIRATION RATE: 16 BRPM | DIASTOLIC BLOOD PRESSURE: 72 MMHG

## 2019-04-30 VITALS — SYSTOLIC BLOOD PRESSURE: 118 MMHG | DIASTOLIC BLOOD PRESSURE: 77 MMHG | RESPIRATION RATE: 18 BRPM | HEART RATE: 65 BPM

## 2019-04-30 RX ADMIN — ENOXAPARIN SODIUM SCH MG: 100 INJECTION SUBCUTANEOUS at 09:31

## 2019-04-30 RX ADMIN — MAGNESIUM HYDROXIDE 1 ML: 400 SUSPENSION ORAL at 09:30

## 2019-04-30 RX ADMIN — MORPHINE SULFATE 1 MG: 30 TABLET, EXTENDED RELEASE ORAL at 12:44

## 2019-04-30 RX ADMIN — DEXAMETHASONE SODIUM PHOSPHATE SCH MG: 10 INJECTION, SOLUTION INTRAMUSCULAR; INTRAVENOUS at 06:54

## 2019-04-30 RX ADMIN — DEXAMETHASONE SODIUM PHOSPHATE 1 MG: 10 INJECTION, SOLUTION INTRAMUSCULAR; INTRAVENOUS at 06:54

## 2019-04-30 RX ADMIN — MORPHINE SULFATE SCH MG: 30 TABLET, EXTENDED RELEASE ORAL at 09:30

## 2019-04-30 RX ADMIN — MORPHINE SULFATE 1 MG: 30 TABLET, EXTENDED RELEASE ORAL at 09:30

## 2019-04-30 RX ADMIN — DEXAMETHASONE SODIUM PHOSPHATE 1 MG: 10 INJECTION, SOLUTION INTRAMUSCULAR; INTRAVENOUS at 12:44

## 2019-04-30 RX ADMIN — DEXAMETHASONE SODIUM PHOSPHATE SCH MG: 10 INJECTION, SOLUTION INTRAMUSCULAR; INTRAVENOUS at 12:44

## 2019-04-30 RX ADMIN — DEXAMETHASONE SODIUM PHOSPHATE 1 MG: 10 INJECTION, SOLUTION INTRAMUSCULAR; INTRAVENOUS at 01:19

## 2019-04-30 RX ADMIN — ENOXAPARIN SODIUM 1 MG: 100 INJECTION SUBCUTANEOUS at 09:31

## 2019-04-30 RX ADMIN — MORPHINE SULFATE SCH MG: 30 TABLET, EXTENDED RELEASE ORAL at 12:44

## 2019-04-30 RX ADMIN — DEXAMETHASONE SODIUM PHOSPHATE SCH MG: 10 INJECTION, SOLUTION INTRAMUSCULAR; INTRAVENOUS at 01:19

## 2019-04-30 NOTE — DS
Date/Time of Note


Date/Time of Note


DATE: 4/30/19 


TIME: 14:22





Discharge Summary


Admission/Discharge Info


Admit Date/Time


Apr 27, 2019 at 12:29


Discharge Date/Time





Discharge Diagnosis


Metastatic prostate cancer


Patient Condition:  Stable


Hospital Course


69 yo male with metastatic prostate cancer admitted for pain control


- pain controlled with PO MS Contin


- Dexamethasone was given


- He was discharged on MS Contin and 7 days of prednisone.  He will follow up 


with his oncologist at [facility]


Reported Medications


Tamsulosin Hcl* (Tamsulosin Hcl*) 0.4 Mg Cap.er.24h, 0.4 MG PO HS, CAP


   11/8/15


Discontinued Reported Medications


Morphine Sulfate (Morphine Sulfate ER) 15 Mg Tablet.er, 1 TAB ORAL BID


   4/25/19


Multivitamins* (Once Daily*) 1 Tab Tablet, 1 TAB PO DAILY, TAB


   11/8/15


Citalopram Hydrobromide* (Citalopram Hydrobromide*) 20 Mg Tablet, 20 MG PO QHS, 


TAB


   11/8/15


Naproxen* (Naprosyn*) 500 Mg Tablet, 500 MG PO BID PRN for PAIN, TAB


   11/8/15


Discontinued Scripts


Ondansetron Hcl* (Zofran* ODT) 4 mg -ODT Tab.disper, 4 MG PO Q4H PRN for NAUSEA 


AND OR VOMITING, #20 TAB


   Prov:AIDAN WESTBROOK MD         11/8/15


Cyclobenzaprine Hcl* (Cyclobenzaprine Hcl*) 10 Mg Tablet, 10 MG PO TID, #15 TAB


   Prov:JOSHUA GONSALEZ DO         3/28/16


Ibuprofen* (Motrin*) 600 Mg Tab, 600 MG PO Q8, #14 TAB


   Prov:JOSHUA GONSALEZ DO         3/28/16


Hydrocodone Bit-Acetaminophen* (Norco*) 5-325 Mg Tab, 1 TAB PO Q6 PRN for PAIN, 


#7 TAB


   Prov:JOSHUA GONSALEZ DO         3/28/16


Ciprofloxacin Hcl* (Ciprofloxacin Hcl*) 500 Mg Tablet, 500 MG PO BID for 10 


Days, TAB


   Prov:AIDAN WESTBROOK MD         11/8/15


Primary Care Provider


Not On Staff Doctor











MAURICIO BLAKELY MD          Apr 30, 2019 14:23

## 2019-05-06 NOTE — ERD
ER Documentation


Chief Complaint


Chief Complaint





GENERALIZED PAIN, TAKES MORPHINE AT HOME DIDN'T TAKE ANY SINCE LAST NIGHT





HPI


This is a 68-year-old male who presented to the emergency department with 


generalized pain and weakness.  The patient has a history of metastatic prostate


carcinoma.  Indicates that he normally takes morphine at home.  Indicates 


however he has not taken morphine for the past 12 hours as he ran out of his 


medications.  He has had a decrease in appetite.  He denies any abdominal pain. 


He has no chest pain.  He has no shortness of breath at rest or exertion.  He 


denies any hemoptysis hematemesis or melanotic stools.  He denies any shortness 


of breath at rest or exertion





ROS


All systems reviewed and are negative except as per history of present illness.





Medications


Home Meds


Reported Medications


Tamsulosin Hcl* (Tamsulosin Hcl*) 0.4 Mg Cap.er.24h, 0.4 MG PO HS, CAP


   11/8/15


Discontinued Reported Medications


Morphine Sulfate (Morphine Sulfate ER) 15 Mg Tablet.er, 1 TAB ORAL BID


   19


Multivitamins* (Once Daily*) 1 Tab Tablet, 1 TAB PO DAILY, TAB


   11/8/15


Discontinued Scripts


Ondansetron Hcl* (Zofran* ODT) 4 mg -ODT Tab.disper, 4 MG PO Q4H PRN for NAUSEA 


AND OR VOMITING, #20 TAB


   Prov:AIDAN WESTBROOK MD         11/8/15





Allergies


Allergies:  


Coded Allergies:  


     tetracycline (Unverified  Allergy, Unknown, RASH, 19)





PMhx/Soc


History of Surgery:  No


Anesthesia Reaction:  No


Hx Neurological Disorder:  No


Hx Respiratory Disorders:  No


Hx Cardiac Disorders:  No (High Cholestrol)


Hx Psychiatric Problems:  No


Hx Miscellaneous Medical Probl:  No


Hx Alcohol Use:  No


Hx Substance Use:  No


Hx Tobacco Use:  No


Smoking Status:  Never smoker





Physical Exam


Physical Exam


Constitutional:Well-developed. Well-nourished.


HEENT:Normocephalic. Atraumatic.Pupils were equal round reactive to light. Moist


mucous membranes.No tonsillar exudates.  Conjunctival pallor


Neck: No nuchal rigidity. No lymphadenopathy. No posterior cervical spine 


tenderness or step-offs.


Respiratory: Not using accessory muscles of respiration.Lungs were clear to 


auscultation bilaterally. No rhonchi. No rales. No wheezing. 


Cardiovascular: Regular rate regular rhythm.No murmurs. No rubs were 


appreciated.S1, S2 normal. Distal pulses are palpable 2+ bilaterally.


GI: Abdomen was soft. Nontender. Non Distended. No pulsatile abdominal masses or


bruits. No rebound. No guarding. Bowel sounds were present and normal. 


Muscle skeletal: Full range of motion of both the upper and lower extremities 


bilaterally.Normal muscle tone.No assymetrical calf tenderness or swelling. 


Skin: Diffuse pallor.  No petechia, no purpura. No lesions on the palms or the 


soles of the feet. No maculopapular rash.


NEURO: Patient was alert, awake, orientated x3.No facial droop. Gait observed 


and normal with no ataxia.Speech had regular rate and rhythm. No focal 


neurological deficits.


Results 24 hrs





Laboratory Tests


       Test
                                 19
10:13  19
10:58


       White Blood Count                     10.3 10^3/ul


       Red Blood Count                       3.72 10^6/ul


       Hemoglobin                               10.2 g/dl


       Hematocrit                                  31.7 %


       Mean Corpuscular Volume                    85.2 fl


       Mean Corpuscular Hemoglobin                27.4 pg


       Mean Corpuscular Hemoglobin
Concent     32.2 g/dl 
  



       Red Cell Distribution Width                 16.0 %


       Platelet Count                         384 10^3/UL


       Mean Platelet Volume                        9.0 fl


       Immature Granulocytes %                    0.400 %


       Neutrophils %                               87.6 %


       Lymphocytes %                                4.4 %


       Monocytes %                                  6.7 %


       Eosinophils %                                0.6 %


       Basophils %                                  0.3 %


       Nucleated Red Blood Cells %            0.0 /100WBC


       Immature Granulocytes #              0.040 10^3/ul


       Neutrophils #                          9.0 10^3/ul


       Lymphocytes #                          0.5 10^3/ul


       Monocytes #                            0.7 10^3/ul


       Eosinophils #                          0.1 10^3/ul


       Basophils #                            0.0 10^3/ul


       Nucleated Red Blood Cells #            0.0 10^3/ul


       Absolute Reticulocyte Count            0.023 X10^6


       Percent Reticulocyte Count                   0.6 %


       Sodium Level                            137 mmol/L


       Potassium Level                         4.5 mmol/L


       Chloride Level                           98 mmol/L


       Carbon Dioxide Level                     28 mmol/L


       Anion Gap                                       11


       Blood Urea Nitrogen                       11 mg/dl


       Creatinine                              0.76 mg/dl


       Est Glomerular Filtrat Rate
mL/min   > 60 mL/min 
   



       Glucose Level                            167 mg/dl


       Calcium Level                           10.0 mg/dl


       Iron Level                                21 ug/dl


       Total Iron Binding Capacity              283 ug/dl


       Percent Iron Saturation                    7 % SAT


       Ferritin                               139.0 ng/ml


       Total Bilirubin                          0.2 mg/dl


       Direct Bilirubin                        0.00 mg/dl


       Indirect Bilirubin                       0.2 mg/dl


       Aspartate Amino Transf
(AST/SGOT)         40 IU/L 
  



       Alanine Aminotransferase
(ALT/SGPT)       12 IU/L 
  



       Alkaline Phosphatase                      226 IU/L


       Lactate Dehydrogenase                     556 IU/L


       Total Protein                             7.5 g/dl


       Albumin                                   4.0 g/dl


       Globulin                                 3.50 g/dl


       Albumin/Globulin Ratio                        1.14


       Prothrombin Time                                         14.4 Sec


       Prothrombin Time Ratio                                        1.1


       INR International Normalized
Ratio   
                      1.11 



       Activated Partial
Thromboplast Time  
                  38.4 Sec 



       Transferrin                                             190 mg/dL





Current Medications


 Medications
   Dose
          Sig/Adolfo
       Start Time
   Status  Last


 (Trade)       Ordered        Route
 PRN     Stop Time              Admin
Dose


                              Reason                                Admin


 Sodium         1,000 ml @ 
   Q1H STAT
      19       DC           19


Chloride       1,000 mls/hr   IV
            10:03
                       10:36



                                             19 11:02


                1 mg           ONCE  STAT
    19       DC           19


Hydromorphone                 IV
            10:03
                       10:37



HCl
                                         19 10:08


(Dilaudid)


 Ondansetron    4 mg           ONCE  STAT
    19       DC           19


HCl
  (Zofran                 IV
            10:03
                       10:37



Inj)                                         19 10:08


                1 mg           ONCE  STAT
    19       DC           19


Hydromorphone                 IV
            12:53
                       13:00



HCl
                                         19 12:54


(Dilaudid)


 Ondansetron    4 mg           ONCE  STAT
    19       DC       



HCl
  (Zofran                 IV
            12:53



Inj)                                         19 12:54








Procedures/MDM


This 68-year-old male with a history of metastatic prostate cancer.  The patient


was placed on a cardiac monitor continuous pulse oximetry and IV access was 


established by nursing staff.  The patient had diffuse pallor however the 


patient's hemoglobin was 10.2.  The patient was typed and crossed.  The patient 


received intravenous morphine and Zofran but his pain had not improved.  I 


obtained a chest radiograph reviewed by the radiologist myself and indicate the 


followin.  The cardiovascular silhouette is stable and unremarkable.


2.  Increasing discoid atelectasis within the right lower lung zone and 


persistent discoid atelectasis in the left lower lung zone. No effusion or 


pneumothorax is evident.


3.  Increased sclerosis is again noted within the proximal left humerus for 


which blastic metastasis cannot be excluded.





Observation Note:


Time:   4 hours


Family Hx:   No Hypertension


Evaluation:   Multiple exams showed improving symptoms and no evidence of 


improvement of his symptoms.  He still appeared to be in pain.  I felt this is a


result of his metastatic prostate cancer and therefore the patient will be adm


itted for intractable pain.  He will be admitted to the hospitalist Dr. Soto.





Departure


Diagnosis:  


   Primary Impression:  


   Pain


   Additional Impression:  


   Prostate cancer metastatic to bone


Condition:  Serious


Patient Instructions:  Living With Prostate Cancer











AIDAN WESTBROOK MD             May 6, 2019 06:14